# Patient Record
Sex: FEMALE | Race: WHITE | NOT HISPANIC OR LATINO | URBAN - NONMETROPOLITAN AREA
[De-identification: names, ages, dates, MRNs, and addresses within clinical notes are randomized per-mention and may not be internally consistent; named-entity substitution may affect disease eponyms.]

---

## 2024-01-10 DIAGNOSIS — E78.5 HYPERLIPIDEMIA, UNSPECIFIED: ICD-10-CM

## 2024-01-12 PROBLEM — I25.10 CAD (CORONARY ARTERY DISEASE): Status: ACTIVE | Noted: 2024-01-12

## 2024-01-12 PROBLEM — I10 ESSENTIAL HYPERTENSION: Status: ACTIVE | Noted: 2024-01-12

## 2024-01-12 PROBLEM — Z95.1 HISTORY OF CORONARY ARTERY BYPASS GRAFT: Status: ACTIVE | Noted: 2024-01-12

## 2024-01-12 PROBLEM — E78.2 MIXED HYPERLIPIDEMIA: Status: ACTIVE | Noted: 2024-01-12

## 2024-01-12 PROBLEM — E03.9 ACQUIRED HYPOTHYROIDISM: Status: ACTIVE | Noted: 2024-01-12

## 2024-01-12 RX ORDER — LOSARTAN POTASSIUM AND HYDROCHLOROTHIAZIDE 12.5; 5 MG/1; MG/1
1 TABLET ORAL DAILY
COMMUNITY

## 2024-01-12 RX ORDER — NITROGLYCERIN 0.4 MG/1
0.4 TABLET SUBLINGUAL EVERY 5 MIN PRN
COMMUNITY

## 2024-01-12 RX ORDER — LEVOTHYROXINE SODIUM 125 UG/1
125 TABLET ORAL DAILY
COMMUNITY

## 2024-01-12 RX ORDER — EZETIMIBE 10 MG/1
10 TABLET ORAL NIGHTLY
Qty: 90 TABLET | Refills: 3 | Status: SHIPPED | OUTPATIENT
Start: 2024-01-12

## 2024-01-12 RX ORDER — CYCLOBENZAPRINE HCL 5 MG
5 TABLET ORAL NIGHTLY PRN
COMMUNITY

## 2024-01-12 RX ORDER — EZETIMIBE 10 MG/1
10 TABLET ORAL NIGHTLY
COMMUNITY

## 2024-01-12 RX ORDER — CARVEDILOL 12.5 MG/1
12.5 TABLET ORAL 2 TIMES DAILY
COMMUNITY

## 2024-01-12 RX ORDER — ASPIRIN 81 MG/1
81 TABLET ORAL DAILY
COMMUNITY

## 2024-01-12 RX ORDER — BLOOD SUGAR DIAGNOSTIC
STRIP MISCELLANEOUS DAILY
COMMUNITY

## 2024-01-12 RX ORDER — DICLOFENAC SODIUM 75 MG/1
75 TABLET, DELAYED RELEASE ORAL 2 TIMES DAILY
COMMUNITY

## 2024-01-12 RX ORDER — FLUTICASONE PROPIONATE 50 MCG
1 SPRAY, SUSPENSION (ML) NASAL DAILY
COMMUNITY
Start: 2020-12-10

## 2024-01-12 RX ORDER — METFORMIN HYDROCHLORIDE 500 MG/1
50 TABLET ORAL DAILY
COMMUNITY

## 2024-01-12 RX ORDER — ROSUVASTATIN CALCIUM 40 MG/1
40 TABLET, COATED ORAL DAILY
COMMUNITY

## 2024-03-11 PROBLEM — R73.09 ABNORMAL GLUCOSE: Status: ACTIVE | Noted: 2024-03-11

## 2024-03-11 PROBLEM — I25.10 ATHEROSCLEROSIS OF NATIVE CORONARY ARTERY WITHOUT ANGINA PECTORIS: Status: ACTIVE | Noted: 2024-03-11

## 2024-07-08 DIAGNOSIS — E78.5 HYPERLIPIDEMIA, UNSPECIFIED: ICD-10-CM

## 2024-07-09 RX ORDER — ROSUVASTATIN CALCIUM 40 MG/1
40 TABLET, COATED ORAL DAILY
Qty: 90 TABLET | Refills: 3 | Status: SHIPPED | OUTPATIENT
Start: 2024-07-09

## 2024-07-24 ENCOUNTER — APPOINTMENT (OUTPATIENT)
Dept: CARDIOLOGY | Facility: CLINIC | Age: 74
End: 2024-07-24
Payer: MEDICARE

## 2024-07-24 VITALS
WEIGHT: 185 LBS | HEIGHT: 67 IN | SYSTOLIC BLOOD PRESSURE: 126 MMHG | DIASTOLIC BLOOD PRESSURE: 70 MMHG | BODY MASS INDEX: 29.03 KG/M2 | HEART RATE: 82 BPM

## 2024-07-24 DIAGNOSIS — I10 ESSENTIAL HYPERTENSION: ICD-10-CM

## 2024-07-24 DIAGNOSIS — Z87.891 FORMER SMOKER: ICD-10-CM

## 2024-07-24 DIAGNOSIS — E03.9 ACQUIRED HYPOTHYROIDISM: ICD-10-CM

## 2024-07-24 DIAGNOSIS — E78.2 MIXED HYPERLIPIDEMIA: ICD-10-CM

## 2024-07-24 DIAGNOSIS — E11.9 DIABETES MELLITUS TYPE II, NON INSULIN DEPENDENT (MULTI): ICD-10-CM

## 2024-07-24 DIAGNOSIS — I25.10 ATHEROSCLEROSIS OF NATIVE CORONARY ARTERY OF NATIVE HEART WITHOUT ANGINA PECTORIS: Primary | ICD-10-CM

## 2024-07-24 DIAGNOSIS — Z95.1 HISTORY OF CORONARY ARTERY BYPASS GRAFT: ICD-10-CM

## 2024-07-24 PROCEDURE — 3078F DIAST BP <80 MM HG: CPT | Performed by: INTERNAL MEDICINE

## 2024-07-24 PROCEDURE — 3074F SYST BP LT 130 MM HG: CPT | Performed by: INTERNAL MEDICINE

## 2024-07-24 PROCEDURE — 1036F TOBACCO NON-USER: CPT | Performed by: INTERNAL MEDICINE

## 2024-07-24 PROCEDURE — 99214 OFFICE O/P EST MOD 30 MIN: CPT | Performed by: INTERNAL MEDICINE

## 2024-07-24 PROCEDURE — 1159F MED LIST DOCD IN RCRD: CPT | Performed by: INTERNAL MEDICINE

## 2024-07-24 PROCEDURE — 3008F BODY MASS INDEX DOCD: CPT | Performed by: INTERNAL MEDICINE

## 2024-07-24 RX ORDER — EZETIMIBE 10 MG/1
10 TABLET ORAL 2 TIMES WEEKLY
Qty: 24 TABLET | Refills: 3 | Status: CANCELLED | OUTPATIENT
Start: 2024-07-25 | End: 2025-07-25

## 2024-07-24 NOTE — PROGRESS NOTES
"Subjective   Radha Bland is a 73 y.o. female       Chief Complaint    Annual Exam          HPI   Patient is in the office for follow-up for the problems noted below.  She reports no symptoms suggestive of recurrent angina pectoris.  She went through a stressful time recently with the passing of her brother and her best friend.  Recent lab data showed uncontrolled diabetes A1c 7.9.  She attributes that to the stress of recent events.  She remains stable from a cardiac standpoint, her weight is unchanged from previously and remains just overweight.  She maintains active lifestyle and physical examination was essentially unremarkable.    ASSESSMENT AND PLAN:      1. History of bypass surgery with LIMA graft to the LAD this was done in the 90s at [facility] in [place],. Last stress test 2022 was normal and the information was shared with the patient, continue risk factors modification which have been very effective and the patient has been asymptomatic.  2.  Essential hypertension, controlled on medical therapy without side effects.  3. Hyperlipidemia, on maximal dose rosuvastatin plus Zetia twice weekly.   4. Hypothyroidism, on replacement therapy and has been euthyroid.  5.  Type 2 diabetes on metformin, currently uncontrolled but it is working progress managed by her PCP.  6. Overweight. The patient has been able to lose weight since her last visit by 10 pounds.  Followup will be scheduled on an annual basis      Tyler Esquivel MD, [facility]   Review of Systems   All other systems reviewed and are negative.           Vitals:    07/24/24 0845   BP: 126/70   BP Location: Left arm   Patient Position: Sitting   Pulse: 82   Weight: 83.9 kg (185 lb)   Height: 1.702 m (5' 7\")        Objective   Physical Exam  Constitutional:       Appearance: Normal appearance.   HENT:      Nose: Nose normal.   Neck:      Vascular: No carotid bruit.   Cardiovascular:      Rate and Rhythm: Normal rate.      Pulses: Normal " pulses.      Heart sounds: Normal heart sounds.   Pulmonary:      Effort: Pulmonary effort is normal.   Abdominal:      General: Bowel sounds are normal.      Palpations: Abdomen is soft.   Musculoskeletal:         General: Normal range of motion.      Cervical back: Normal range of motion.      Right lower leg: No edema.      Left lower leg: No edema.   Skin:     General: Skin is warm and dry.   Neurological:      General: No focal deficit present.      Mental Status: She is alert.   Psychiatric:         Mood and Affect: Mood normal.         Behavior: Behavior normal.         Thought Content: Thought content normal.         Judgment: Judgment normal.         Allergies  Kenalog [triamcinolone acetonide] and Triamcinolone     Current Medications    Current Outpatient Medications:     Accu-Chek SmartView Test Strip strip, once daily., Disp: , Rfl:     aspirin 81 mg EC tablet, Take 1 tablet (81 mg) by mouth once daily., Disp: , Rfl:     carvedilol (Coreg) 12.5 mg tablet, Take 1 tablet (12.5 mg) by mouth 2 times a day., Disp: , Rfl:     cyclobenzaprine (Flexeril) 5 mg tablet, Take 1 tablet (5 mg) by mouth as needed at bedtime for muscle spasms., Disp: , Rfl:     diclofenac (Voltaren) 75 mg EC tablet, Take 1 tablet (75 mg) by mouth 2 times a day., Disp: , Rfl:     ezetimibe (Zetia) 10 mg tablet, Take 1 tablet (10 mg) by mouth 2 times a week. Tuesdays and Saturdays, Disp: , Rfl:     fluticasone (Flonase) 50 mcg/actuation nasal spray, Administer 1 spray into each nostril once daily., Disp: , Rfl:     losartan-hydrochlorothiazide (Hyzaar) 50-12.5 mg tablet, Take 1 tablet by mouth once daily., Disp: , Rfl:     metFORMIN (Glucophage) 500 mg tablet, Take 1 tablet (500 mg) by mouth once daily. take with evening meal, Disp: , Rfl:     nitroglycerin (Nitrostat) 0.4 mg SL tablet, Place 1 tablet (0.4 mg) under the tongue every 5 minutes if needed for chest pain., Disp: , Rfl:     rosuvastatin (Crestor) 40 mg tablet, TAKE 1 TABLET  BY MOUTH EVERY DAY, Disp: 90 tablet, Rfl: 3    Synthroid 125 mcg tablet, Take 1 tablet (125 mcg) by mouth once daily., Disp: , Rfl:                      Assessment/Plan   1. Atherosclerosis of native coronary artery of native heart without angina pectoris  Follow Up In Cardiology      2. History of coronary artery bypass graft        3. Essential hypertension        4. Mixed hyperlipidemia        5. Acquired hypothyroidism        6. BMI 28.0-28.9,adult        7. Former smoker        8. Diabetes mellitus type II, non insulin dependent (Multi)                 Scribe Attestation  By signing my name below, IVickie LPN  , Scribe   attest that this documentation has been prepared under the direction and in the presence of Tyler Esquivel MD.     Provider Attestation - Scribe documentation    All medical record entries made by the Scribe were at my direction and personally dictated by me. I have reviewed the chart and agree that the record accurately reflects my personal performance of the history, physical exam, discussion and plan.

## 2024-07-24 NOTE — PATIENT INSTRUCTIONS
Please bring all medicines, vitamins, and herbal supplements with you when you come to the office.    Prescriptions will not be filled unless you are compliant with your follow up appointments or have a follow up appointment scheduled as per instruction of your physician. Refills should be requested at the time of your visit.     BMI was above normal measurement. Current weight: 83.9 kg (185 lb)  Weight change since last visit (-) denotes wt loss 1 lbs   Weight loss needed to achieve BMI 25: 25.7 Lbs  Weight loss needed to achieve BMI 30: -6.1 Lbs  Provided instructions on dietary changes  Provided instructions on exercise.

## 2024-07-24 NOTE — LETTER
July 24, 2024     Sotero Burger DO  [address]  [facility] [address]    Patient: Radha Bland   YOB: 1950   Date of Visit: 7/24/2024       Dear Dr. Sotero Burger DO:    Thank you for referring Radha Bland to me for evaluation. Below are my notes for this consultation.  If you have questions, please do not hesitate to call me. I look forward to following your patient along with you.       Sincerely,     Tyler Esquivel MD      CC: No Recipients  ______________________________________________________________________________________    Subjective   Radha Bland is a 73 y.o. female       Chief Complaint    Annual Exam          HPI   Patient is in the office for follow-up for the problems noted below.  She reports no symptoms suggestive of recurrent angina pectoris.  She went through a stressful time recently with the passing of her brother and her best friend.  Recent lab data showed uncontrolled diabetes A1c 7.9.  She attributes that to the stress of recent events.  She remains stable from a cardiac standpoint, her weight is unchanged from previously and remains just overweight.  She maintains active lifestyle and physical examination was essentially unremarkable.    ASSESSMENT AND PLAN:      1. History of bypass surgery with LIMA graft to the LAD this was done in the 90s at [facility] in [place],. Last stress test 2022 was normal and the information was shared with the patient, continue risk factors modification which have been very effective and the patient has been asymptomatic.  2.  Essential hypertension, controlled on medical therapy without side effects.  3. Hyperlipidemia, on maximal dose rosuvastatin plus Zetia twice weekly.   4. Hypothyroidism, on replacement therapy and has been euthyroid.  5.  Type 2 diabetes on metformin, currently uncontrolled but it is working progress managed by her PCP.  6. Overweight. The patient has been able to lose weight  "since her last visit by 10 pounds.  Followup will be scheduled on an annual basis      Tyler Esquivel MD, [facility]   Review of Systems   All other systems reviewed and are negative.           Vitals:    07/24/24 0845   BP: 126/70   BP Location: Left arm   Patient Position: Sitting   Pulse: 82   Weight: 83.9 kg (185 lb)   Height: 1.702 m (5' 7\")        Objective   Physical Exam  Constitutional:       Appearance: Normal appearance.   HENT:      Nose: Nose normal.   Neck:      Vascular: No carotid bruit.   Cardiovascular:      Rate and Rhythm: Normal rate.      Pulses: Normal pulses.      Heart sounds: Normal heart sounds.   Pulmonary:      Effort: Pulmonary effort is normal.   Abdominal:      General: Bowel sounds are normal.      Palpations: Abdomen is soft.   Musculoskeletal:         General: Normal range of motion.      Cervical back: Normal range of motion.      Right lower leg: No edema.      Left lower leg: No edema.   Skin:     General: Skin is warm and dry.   Neurological:      General: No focal deficit present.      Mental Status: She is alert.   Psychiatric:         Mood and Affect: Mood normal.         Behavior: Behavior normal.         Thought Content: Thought content normal.         Judgment: Judgment normal.         Allergies  Kenalog [triamcinolone acetonide] and Triamcinolone     Current Medications    Current Outpatient Medications:   •  Accu-Chek SmartView Test Strip strip, once daily., Disp: , Rfl:   •  aspirin 81 mg EC tablet, Take 1 tablet (81 mg) by mouth once daily., Disp: , Rfl:   •  carvedilol (Coreg) 12.5 mg tablet, Take 1 tablet (12.5 mg) by mouth 2 times a day., Disp: , Rfl:   •  cyclobenzaprine (Flexeril) 5 mg tablet, Take 1 tablet (5 mg) by mouth as needed at bedtime for muscle spasms., Disp: , Rfl:   •  diclofenac (Voltaren) 75 mg EC tablet, Take 1 tablet (75 mg) by mouth 2 times a day., Disp: , Rfl:   •  ezetimibe (Zetia) 10 mg tablet, Take 1 tablet (10 mg) by mouth 2 times a week. " Tuesdays and Saturdays, Disp: , Rfl:   •  fluticasone (Flonase) 50 mcg/actuation nasal spray, Administer 1 spray into each nostril once daily., Disp: , Rfl:   •  losartan-hydrochlorothiazide (Hyzaar) 50-12.5 mg tablet, Take 1 tablet by mouth once daily., Disp: , Rfl:   •  metFORMIN (Glucophage) 500 mg tablet, Take 1 tablet (500 mg) by mouth once daily. take with evening meal, Disp: , Rfl:   •  nitroglycerin (Nitrostat) 0.4 mg SL tablet, Place 1 tablet (0.4 mg) under the tongue every 5 minutes if needed for chest pain., Disp: , Rfl:   •  rosuvastatin (Crestor) 40 mg tablet, TAKE 1 TABLET BY MOUTH EVERY DAY, Disp: 90 tablet, Rfl: 3  •  Synthroid 125 mcg tablet, Take 1 tablet (125 mcg) by mouth once daily., Disp: , Rfl:                      Assessment/Plan   1. Atherosclerosis of native coronary artery of native heart without angina pectoris  Follow Up In Cardiology      2. History of coronary artery bypass graft        3. Essential hypertension        4. Mixed hyperlipidemia        5. Acquired hypothyroidism        6. BMI 28.0-28.9,adult        7. Former smoker        8. Diabetes mellitus type II, non insulin dependent (Multi)                 Scribe Attestation  By signing my name below, I, Claudia Valdivia LPN   attest that this documentation has been prepared under the direction and in the presence of Tyler Esquivel MD.     Provider Attestation - Scribe documentation    All medical record entries made by the Scribe were at my direction and personally dictated by me. I have reviewed the chart and agree that the record accurately reflects my personal performance of the history, physical exam, discussion and plan.

## 2024-10-03 DIAGNOSIS — I10 ESSENTIAL (PRIMARY) HYPERTENSION: ICD-10-CM

## 2024-10-03 RX ORDER — LOSARTAN POTASSIUM AND HYDROCHLOROTHIAZIDE 12.5; 5 MG/1; MG/1
1 TABLET ORAL DAILY
Qty: 90 TABLET | Refills: 3 | Status: SHIPPED | OUTPATIENT
Start: 2024-10-03 | End: 2025-10-03

## 2024-10-10 ENCOUNTER — TELEPHONE (OUTPATIENT)
Dept: CARDIOLOGY | Facility: CLINIC | Age: 74
End: 2024-10-10
Payer: MEDICARE

## 2024-10-10 DIAGNOSIS — I25.10 ATHEROSCLEROSIS OF NATIVE CORONARY ARTERY OF NATIVE HEART WITHOUT ANGINA PECTORIS: ICD-10-CM

## 2024-10-10 DIAGNOSIS — R01.1 MURMUR, HEART: ICD-10-CM

## 2024-10-10 NOTE — TELEPHONE ENCOUNTER
Patient phones stating she went to see her allergist yesterday who reports that he heard a heart murmur. Pt has been dealing with a rash and she inquired with allergist if rash could be causing heart murmur. Patient inquiring if there should seek further testing or evaluation. Please advise

## 2024-10-11 NOTE — TELEPHONE ENCOUNTER
Informed patient of recommendation to have heart echo. She verbalized understanding. Order placed and sent to Dr. Tyler Esquivel MD for signature.     Task sent to  to call and arrange once order signed.

## 2024-11-18 ENCOUNTER — HOSPITAL ENCOUNTER (OUTPATIENT)
Dept: CARDIOLOGY | Facility: CLINIC | Age: 74
Discharge: HOME | End: 2024-11-18
Payer: MEDICARE

## 2024-11-18 VITALS
SYSTOLIC BLOOD PRESSURE: 130 MMHG | BODY MASS INDEX: 29.03 KG/M2 | HEIGHT: 67 IN | WEIGHT: 185 LBS | DIASTOLIC BLOOD PRESSURE: 72 MMHG

## 2024-11-18 DIAGNOSIS — R01.1 MURMUR, HEART: ICD-10-CM

## 2024-11-18 DIAGNOSIS — I25.10 ATHEROSCLEROSIS OF NATIVE CORONARY ARTERY OF NATIVE HEART WITHOUT ANGINA PECTORIS: ICD-10-CM

## 2024-11-18 PROCEDURE — 93306 TTE W/DOPPLER COMPLETE: CPT | Performed by: INTERNAL MEDICINE

## 2024-11-18 PROCEDURE — 93306 TTE W/DOPPLER COMPLETE: CPT

## 2024-11-19 LAB
AORTIC VALVE MEAN GRADIENT: 5 MMHG
AORTIC VALVE PEAK VELOCITY: 1.5 M/S
AV PEAK GRADIENT: 9 MMHG
AVA (PEAK VEL): 2.31 CM2
AVA (VTI): 2.25 CM2
EJECTION FRACTION APICAL 4 CHAMBER: 74.6
EJECTION FRACTION: 63 %
LEFT VENTRICLE INTERNAL DIMENSION DIASTOLE: 4.27 CM (ref 3.5–6)
LEFT VENTRICULAR OUTFLOW TRACT DIAMETER: 2.3 CM
LV EJECTION FRACTION BIPLANE: 57 %
MITRAL VALVE E/A RATIO: 0.61
MITRAL VALVE E/E' RATIO: 7.7

## 2025-04-03 DIAGNOSIS — E78.5 HYPERLIPIDEMIA, UNSPECIFIED: ICD-10-CM

## 2025-04-07 RX ORDER — ROSUVASTATIN CALCIUM 40 MG/1
40 TABLET, COATED ORAL DAILY
Qty: 90 TABLET | Refills: 3 | Status: SHIPPED | OUTPATIENT
Start: 2025-04-07

## 2025-07-24 ENCOUNTER — APPOINTMENT (OUTPATIENT)
Dept: CARDIOLOGY | Facility: CLINIC | Age: 75
End: 2025-07-24
Payer: MEDICARE

## 2025-07-24 VITALS
WEIGHT: 178.2 LBS | BODY MASS INDEX: 27.97 KG/M2 | DIASTOLIC BLOOD PRESSURE: 68 MMHG | SYSTOLIC BLOOD PRESSURE: 116 MMHG | HEART RATE: 84 BPM | HEIGHT: 67 IN

## 2025-07-24 DIAGNOSIS — E03.9 ACQUIRED HYPOTHYROIDISM: ICD-10-CM

## 2025-07-24 DIAGNOSIS — I10 ESSENTIAL HYPERTENSION: ICD-10-CM

## 2025-07-24 DIAGNOSIS — E66.3 OVERWEIGHT: ICD-10-CM

## 2025-07-24 DIAGNOSIS — E78.2 MIXED HYPERLIPIDEMIA: ICD-10-CM

## 2025-07-24 DIAGNOSIS — I25.10 ATHEROSCLEROSIS OF NATIVE CORONARY ARTERY OF NATIVE HEART WITHOUT ANGINA PECTORIS: Primary | ICD-10-CM

## 2025-07-24 DIAGNOSIS — Z87.891 FORMER SMOKER: ICD-10-CM

## 2025-07-24 DIAGNOSIS — R01.1 CARDIAC MURMUR: ICD-10-CM

## 2025-07-24 DIAGNOSIS — Z95.1 HISTORY OF CORONARY ARTERY BYPASS GRAFT: ICD-10-CM

## 2025-07-24 PROBLEM — I35.0 NONRHEUMATIC AORTIC (VALVE) STENOSIS: Status: ACTIVE | Noted: 2025-07-24

## 2025-07-24 PROCEDURE — G2211 COMPLEX E/M VISIT ADD ON: HCPCS | Performed by: INTERNAL MEDICINE

## 2025-07-24 PROCEDURE — 3074F SYST BP LT 130 MM HG: CPT | Performed by: INTERNAL MEDICINE

## 2025-07-24 PROCEDURE — 3078F DIAST BP <80 MM HG: CPT | Performed by: INTERNAL MEDICINE

## 2025-07-24 PROCEDURE — 1159F MED LIST DOCD IN RCRD: CPT | Performed by: INTERNAL MEDICINE

## 2025-07-24 PROCEDURE — 99214 OFFICE O/P EST MOD 30 MIN: CPT | Performed by: INTERNAL MEDICINE

## 2025-07-24 PROCEDURE — 3008F BODY MASS INDEX DOCD: CPT | Performed by: INTERNAL MEDICINE

## 2025-07-24 RX ORDER — NITROGLYCERIN 0.4 MG/1
0.4 TABLET SUBLINGUAL EVERY 5 MIN PRN
Qty: 100 TABLET | Refills: 1 | Status: SHIPPED | OUTPATIENT
Start: 2025-07-24

## 2025-07-24 NOTE — PATIENT INSTRUCTIONS
Please bring all medicines, vitamins, and herbal supplements with you when you come to the office.    Prescriptions will not be filled unless you are compliant with your follow up appointments or have a follow up appointment scheduled as per instruction of your physician. Refills should be requested at the time of your visit.     BMI was above normal measurement. Current weight: 80.8 kg (178 lb 3.2 oz)  Weight change since last visit (-) denotes wt loss -6.8 lbs   Weight loss needed to achieve BMI 25: 18.9 Lbs  Weight loss needed to achieve BMI 30: -12.9 Lbs  Provided instructions on dietary changes  Provided instructions on exercise.

## 2025-07-24 NOTE — LETTER
July 24, 2025     Philip Crocker MD  [address]  [place] [address]    Patient: Radha Bland   YOB: 1950   Date of Visit: 7/24/2025       Dear Dr. Philip Crocker MD:    Thank you for referring Rdaha Bland to me for evaluation. Below are my notes for this consultation.  If you have questions, please do not hesitate to call me. I look forward to following your patient along with you.       Sincerely,     Tyler Esquivel MD      CC: No Recipients  ______________________________________________________________________________________    HPI  Patient is in the office for annual follow-up for CAD and previous coronary bypass surgery involving LIMA graft to the LAD in the remote past more than 30 years ago.  Her last nuclear stress test in 2022 was normal.  Since her last visit year ago she lost another brother around [facility] for chronic illness.  She denies any shortness of breath or palpitations.  Her weight is down a few pounds from last visit on purpose.  Her A1c has dropped to 6.8 which is great.  Thyroid function test from June 2025 was on target.  Comprehensive metabolic profile was reviewed with her and her numbers are on target as well.  Lipid panel from June 2025 was on target LDL cholesterol 62 mg/dL.  Her examination is remarkable for a very soft 1/6 systolic murmur, when you look at the echocardiogram November 2024 there was no significant disease involving the mitral or the aortic valve.  She has no palpitations syncope or near syncope.  She has no side effect of current medication which have been well-tolerated.    ASSESSMENT AND PLAN:      1. History of bypass surgery with LIMA graft to the LAD this was done in the 90s at [facility] in [place],. Last stress test 2022 was normal, she had recent symptoms of chest heaviness walking the dog in extreme heat but not in any left circumstances, indicated to the patient that if this becomes frequent  "specially under less than intense situations need to let me know so we can investigate.  She was provided with sublingual nitroglycerin to be used on as-needed basis.   risk factors for CAD have been completely under control   2.  Essential hypertension, controlled on medical therapy without side effects.  Patient has been compliant with low-salt diet, regular exercise and is trying to lose more weight.  She has no sleep apnea.  3. Hyperlipidemia, on maximal dose rosuvastatin plus Zetia twice weekly.  LDL cholesterol June 2025 was 62 mg/dL which is on target.  She follows low-fat diet and maintains active lifestyle.  4. Hypothyroidism, on replacement therapy and has been euthyroid.  Most recent TSH June 2025 was on target.  5.  Type 2 diabetes on metformin, A1c is down to 6.8 on recent testing June 2025.  Patient is following a low carbohydrate diet, maintains active lifestyle and is losing weight.  6. Overweight. The patient has been able to lose weight since her last visit by 8 pounds.  Followup will be scheduled on an annual basis   ROS   Occasional chest heaviness when walking the dog under extreme heat but not under any other conditions      Vitals:    07/24/25 0837   BP: 116/68   BP Location: Left arm   Patient Position: Sitting   Pulse: 84   Weight: 80.8 kg (178 lb 3.2 oz)   Height: 1.702 m (5' 7\")        Objective   Physical Exam  Constitutional:       Appearance: Normal appearance.   HENT:      Nose: Nose normal.   Neck:      Vascular: No carotid bruit.     Cardiovascular:      Rate and Rhythm: Normal rate.      Pulses: Normal pulses.      Heart sounds: Murmur heard.      Systolic murmur is present with a grade of 1/6.      Comments: Soft aortic murmur  Pulmonary:      Effort: Pulmonary effort is normal.   Abdominal:      General: Bowel sounds are normal.      Palpations: Abdomen is soft.     Musculoskeletal:         General: Normal range of motion.      Cervical back: Normal range of motion.      Right " lower leg: No edema.      Left lower leg: No edema.     Skin:     General: Skin is warm and dry.     Neurological:      General: No focal deficit present.      Mental Status: She is alert.     Psychiatric:         Mood and Affect: Mood normal.         Behavior: Behavior normal.         Thought Content: Thought content normal.         Judgment: Judgment normal.         Allergies  Kenalog [triamcinolone acetonide] and Triamcinolone     Current Medications  Current Outpatient Medications   Medication Instructions   • Accu-Chek SmartView Test Strip strip Daily   • aspirin 81 mg, Daily   • carvedilol (COREG) 12.5 mg, 2 times daily   • cyclobenzaprine (FLEXERIL) 5 mg, Nightly PRN   • diclofenac (VOLTAREN) 75 mg, 2 times daily   • ezetimibe (ZETIA) 10 mg, 2 times weekly   • fluticasone (Flonase) 50 mcg/actuation nasal spray 1 spray, Daily   • losartan-hydrochlorothiazide (Hyzaar) 50-12.5 mg tablet 1 tablet, oral, Daily   • metFORMIN (GLUCOPHAGE) 500 mg, 2 times daily (morning and late afternoon)   • nitroglycerin (NITROSTAT) 0.4 mg, sublingual, Every 5 min PRN   • rosuvastatin (CRESTOR) 40 mg, oral, Daily   • Synthroid 125 mcg, Daily                        Assessment/Plan   1. Atherosclerosis of native coronary artery of native heart without angina pectoris  Follow Up In Cardiology    Follow Up In Cardiology    nitroglycerin (Nitrostat) 0.4 mg SL tablet      2. History of coronary artery bypass graft        3. Nonrheumatic aortic (valve) stenosis        4. Mixed hyperlipidemia        5. Essential hypertension        6. Acquired hypothyroidism        7. BMI 27.0-27.9,adult        8. Former smoker                 Scribe Attestation  By signing my name below, Vickie GARCIA LPN Sc[place]   attest that this documentation has been prepared under the direction and in the presence of Tyler Esquivel MD.     Provider Attestation - Scribe documentation    All medical record entries made by the Scribe were at my direction and  personally dictated by me. I have reviewed the chart and agree that the record accurately reflects my personal performance of the history, physical exam, discussion and plan.

## 2025-07-24 NOTE — PROGRESS NOTES
HPI  Patient is in the office for annual follow-up for CAD and previous coronary bypass surgery involving LIMA graft to the LAD in the remote past more than 30 years ago.  Her last nuclear stress test in 2022 was normal.  Since her last visit year ago she lost another [facility].  She denies any shortness of breath or palpitations.  Her weight is down a few pounds from last visit on purpose.  Her A1c has dropped to 6.8 which is great.  Thyroid function test from June 2025 was on target.  Comprehensive metabolic profile was reviewed with her and her numbers are on target as well.  Lipid panel from June 2025 was on target LDL cholesterol 62 mg/dL.  Her examination is remarkable for a very soft 1/6 systolic murmur, when you look at the echocardiogram November 2024 there was no significant disease involving the mitral or the aortic valve.  She has no palpitations syncope or near syncope.  She has no side effect of current medication which have been well-tolerated.    ASSESSMENT AND PLAN:      1. History of bypass surgery with LIMA graft to the LAD this was done in the 90s at [facility] in [place],. Last stress test 2022 was normal, she had recent symptoms of chest heaviness walking the dog in extreme heat but not in any left circumstances, indicated to the patient that if this becomes frequent specially under less than intense situations need to let me know so we can investigate.  She was provided with sublingual nitroglycerin to be used on as-needed basis.   risk factors for CAD have been completely under control   2.  Essential hypertension, controlled on medical therapy without side effects.  Patient has been compliant with low-salt diet, regular exercise and is trying to lose more weight.  She has no sleep apnea.  3. Hyperlipidemia, on maximal dose rosuvastatin plus Zetia twice weekly.  LDL cholesterol June 2025 was 62 mg/dL which is on target.  She follows low-fat  "diet and maintains active lifestyle.  4. Hypothyroidism, on replacement therapy and has been euthyroid.  Most recent TSH June 2025 was on target.  5.  Type 2 diabetes on metformin, A1c is down to 6.8 on recent testing June 2025.  Patient is following a low carbohydrate diet, maintains active lifestyle and is losing weight.  6. Overweight. The patient has been able to lose weight since her last visit by 8 pounds.  Followup will be scheduled on an annual basis   ROS   Occasional chest heaviness when walking the dog under extreme heat but not under any other conditions      Vitals:    07/24/25 0837   BP: 116/68   BP Location: Left arm   Patient Position: Sitting   Pulse: 84   Weight: 80.8 kg (178 lb 3.2 oz)   Height: 1.702 m (5' 7\")        Objective   Physical Exam  Constitutional:       Appearance: Normal appearance.   HENT:      Nose: Nose normal.   Neck:      Vascular: No carotid bruit.     Cardiovascular:      Rate and Rhythm: Normal rate.      Pulses: Normal pulses.      Heart sounds: Murmur heard.      Systolic murmur is present with a grade of 1/6.      Comments: Soft aortic murmur  Pulmonary:      Effort: Pulmonary effort is normal.   Abdominal:      General: Bowel sounds are normal.      Palpations: Abdomen is soft.     Musculoskeletal:         General: Normal range of motion.      Cervical back: Normal range of motion.      Right lower leg: No edema.      Left lower leg: No edema.     Skin:     General: Skin is warm and dry.     Neurological:      General: No focal deficit present.      Mental Status: She is alert.     Psychiatric:         Mood and Affect: Mood normal.         Behavior: Behavior normal.         Thought Content: Thought content normal.         Judgment: Judgment normal.         Allergies  Kenalog [triamcinolone acetonide] and Triamcinolone     Current Medications  Current Outpatient Medications   Medication Instructions    Accu-Chek SmartView Test Strip strip Daily    aspirin 81 mg, Daily    " carvedilol (COREG) 12.5 mg, 2 times daily    cyclobenzaprine (FLEXERIL) 5 mg, Nightly PRN    diclofenac (VOLTAREN) 75 mg, 2 times daily    ezetimibe (ZETIA) 10 mg, 2 times weekly    fluticasone (Flonase) 50 mcg/actuation nasal spray 1 spray, Daily    losartan-hydrochlorothiazide (Hyzaar) 50-12.5 mg tablet 1 tablet, oral, Daily    metFORMIN (GLUCOPHAGE) 500 mg, 2 times daily (morning and late afternoon)    nitroglycerin (NITROSTAT) 0.4 mg, sublingual, Every 5 min PRN    rosuvastatin (CRESTOR) 40 mg, oral, Daily    Synthroid 125 mcg, Daily                        Assessment/Plan   1. Atherosclerosis of native coronary artery of native heart without angina pectoris  Follow Up In Cardiology    Follow Up In Cardiology    nitroglycerin (Nitrostat) 0.4 mg SL tablet      2. History of coronary artery bypass graft        3. Nonrheumatic aortic (valve) stenosis        4. Mixed hyperlipidemia        5. Essential hypertension        6. Acquired hypothyroidism        7. BMI 27.0-27.9,adult        8. Former smoker                 Scribe Attestation  By signing my name below, I, Claudia Valdivia LPN   attest that this documentation has been prepared under the direction and in the presence of Tyler Esquivel MD.     Provider Attestation - Scribe documentation    All medical record entries made by the Scribe were at my direction and personally dictated by me. I have reviewed the chart and agree that the record accurately reflects my personal performance of the history, physical exam, discussion and plan.

## 2026-07-28 ENCOUNTER — APPOINTMENT (OUTPATIENT)
Dept: CARDIOLOGY | Facility: CLINIC | Age: 76
End: 2026-07-28
Payer: MEDICARE